# Patient Record
Sex: FEMALE | Race: BLACK OR AFRICAN AMERICAN | Employment: OTHER | URBAN - METROPOLITAN AREA
[De-identification: names, ages, dates, MRNs, and addresses within clinical notes are randomized per-mention and may not be internally consistent; named-entity substitution may affect disease eponyms.]

---

## 2017-09-16 ENCOUNTER — HOSPITAL ENCOUNTER (EMERGENCY)
Age: 72
Discharge: HOME | End: 2017-09-16
Payer: MEDICARE

## 2017-09-16 VITALS
WEIGHT: 147 LBS | OXYGEN SATURATION: 98 % | DIASTOLIC BLOOD PRESSURE: 88 MMHG | HEART RATE: 78 BPM | SYSTOLIC BLOOD PRESSURE: 135 MMHG | TEMPERATURE: 98.1 F | BODY MASS INDEX: 28.71 KG/M2 | RESPIRATION RATE: 16 BRPM

## 2017-09-16 DIAGNOSIS — H11.32 SUBCONJUNCTIVAL HEMATOMA, LEFT: Primary | ICD-10-CM

## 2017-09-16 PROCEDURE — 99283 EMERGENCY DEPT VISIT LOW MDM: CPT

## 2017-09-16 NOTE — ED PROVIDER NOTES
HPI Comments: 79y female with red eye. Patient was not having pain however her daughters noticed that her eye was red and looked like it was bleeding. Patient does not recall any trauma. She has been rubbing her eye yesterday. No sneezes or heart R and coughs no Valsalva or heavy lifting. Patient states her vision is unchanged. She is not on any anticoagulation other than an aspirin a day. She does have a history of A. Fib is on flecainide but no other anticoagulation therapy    Patient is a 67 y.o. female presenting with eye irritation. The history is provided by the patient. Red Eye    This is a new problem. The current episode started 6 to 12 hours ago. The problem occurs constantly. The problem has not changed since onset. The left eye is affected. The injury mechanism was none. The pain is at a severity of 0/10. The patient is experiencing no pain. Associated symptoms include eye redness and negative. Pertinent negatives include no blurred vision, no decreased vision, no discharge, no double vision, no foreign body sensation, no photophobia, no tingling, no pain and no blindness. Past Medical History:   Diagnosis Date    CAD (coronary artery disease)     High cholesterol    Hypertension     Other ill-defined conditions     Paroximal A fib     Other ill-defined conditions     Vitamin D deficiency        Past Surgical History:   Procedure Laterality Date    HX HERNIA REPAIR      HX TUBAL LIGATION           History reviewed. No pertinent family history. Social History     Social History    Marital status:      Spouse name: N/A    Number of children: N/A    Years of education: N/A     Occupational History    Not on file.      Social History Main Topics    Smoking status: Former Smoker    Smokeless tobacco: Never Used    Alcohol use No    Drug use: Not on file    Sexual activity: Not on file     Other Topics Concern    Not on file     Social History Narrative         ALLERGIES: Amlodipine and Sulfa (sulfonamide antibiotics)    Review of Systems   Constitutional: Negative. Negative for activity change. HENT: Negative. Eyes: Positive for redness. Negative for blindness, blurred vision, double vision, photophobia, pain and discharge. Respiratory: Negative. Cardiovascular: Negative. Gastrointestinal: Negative. Genitourinary: Negative. Musculoskeletal: Negative. Skin: Negative. Neurological: Negative. Negative for tingling. Psychiatric/Behavioral: Negative. All other systems reviewed and are negative. Vitals:    09/16/17 1143   BP: 135/88   Pulse: 78   Resp: 16   Temp: 98.1 °F (36.7 °C)   SpO2: 98%   Weight: 66.7 kg (147 lb)            Physical Exam   Constitutional: She is oriented to person, place, and time. She appears well-developed and well-nourished. No distress. HENT:   Head: Normocephalic and atraumatic. Right Ear: External ear normal.   Left Ear: External ear normal.   Nose: Nose normal.   Mouth/Throat: Oropharynx is clear and moist. No oropharyngeal exudate. Eyes: Conjunctivae and EOM are normal. Pupils are equal, round, and reactive to light. Right eye exhibits no discharge. Left eye exhibits no discharge. No scleral icterus. Neck: Normal range of motion. Neck supple. No JVD present. No tracheal deviation present. Cardiovascular: Normal rate, regular rhythm and intact distal pulses. Pulmonary/Chest: Effort normal and breath sounds normal. No stridor. No respiratory distress. She has no wheezes. She exhibits no tenderness. Abdominal: Soft. Bowel sounds are normal. She exhibits no distension and no mass. There is no tenderness. Musculoskeletal: Normal range of motion. She exhibits no edema or tenderness. Neurological: She is alert and oriented to person, place, and time. No cranial nerve deficit. Skin: Skin is warm and dry. No rash noted. She is not diaphoretic. No erythema. No pallor.    Psychiatric: She has a normal mood and affect. Her behavior is normal. Thought content normal.   Nursing note and vitals reviewed. MDM  Number of Diagnoses or Management Options  Diagnosis management comments: Subconjunctival hematoma. Patient has no reason to suspect coagulopathy. Patient's vision is good. We'll have her follow-up in one week with her primary care. She can return to the ED anytime this problem.     ED Course       Procedures

## 2017-09-16 NOTE — ED NOTES
I have reviewed discharge instructions with the patient. The patient verbalized understanding. Patient left ED via Discharge Method: ambulatory to Home with self. Opportunity for questions and clarification provided. Patient given 0 scripts. Patient is in no acute distress at this time.

## 2017-09-16 NOTE — DISCHARGE INSTRUCTIONS
Subconjunctival Hemorrhage: Care Instructions  Your Care Instructions    Sometimes small blood vessels in the white of the eye can break, causing a red spot or speck. This is called a subconjunctival hemorrhage. The blood vessels may break when you sneeze, cough, vomit, strain, or bend over. Sometimes there is no clear cause. The blood may look alarming, especially if the spot is large. If there is no pain or vision change, there is usually no reason to worry, and the blood slowly will go away on its own in 2 to 3 weeks. Follow-up care is a key part of your treatment and safety. Be sure to make and go to all appointments, and call your doctor if you are having problems. It's also a good idea to know your test results and keep a list of the medicines you take. How can you care for yourself at home? · Watch for changes in your eye. It is normal for the red spot on your eyeball to change color as it heals. Just like a bruise on your skin, it may change from red to brown to purple to yellow. · Do not take aspirin or products that contain aspirin, which can increase bleeding. Use acetaminophen (Tylenol) if you need pain relief for another problem. · Do not take two or more pain medicines at the same time unless the doctor told you to. Many pain medicines have acetaminophen, which is Tylenol. Too much acetaminophen (Tylenol) can be harmful. When should you call for help? Call your doctor now or seek immediate medical care if:  · You have signs of an eye infection, such as:  ¨ Pus or thick discharge coming from the eye. ¨ Redness or swelling around the eye. ¨ A fever. · You see blood over the black part of your eye (pupil). · You have any changes or problems in your vision. · You have any pain in your eye. Watch closely for changes in your health, and be sure to contact your doctor if:  · You do not get better as expected. Where can you learn more?   Go to http://tamara-pietro.info/. Enter Y189 in the search box to learn more about \"Subconjunctival Hemorrhage: Care Instructions. \"  Current as of: March 20, 2017  Content Version: 11.3  © 2983-1374 [facility], [employer]. Care instructions adapted under license by [email or web address] (which disclaims liability or warranty for this information). If you have questions about a medical condition or this instruction, always ask your healthcare professional. [address] any warranty or liability for your use of this information.

## 2023-11-04 ENCOUNTER — HOSPITAL ENCOUNTER (EMERGENCY)
Age: 78
Discharge: HOME | End: 2023-11-04
Payer: MEDICARE

## 2023-11-04 ENCOUNTER — APPOINTMENT (OUTPATIENT)
Dept: GENERAL RADIOLOGY | Age: 78
End: 2023-11-04
Payer: MEDICARE

## 2023-11-04 VITALS
OXYGEN SATURATION: 97 % | SYSTOLIC BLOOD PRESSURE: 152 MMHG | TEMPERATURE: 97.4 F | WEIGHT: 125 LBS | RESPIRATION RATE: 16 BRPM | HEART RATE: 80 BPM | HEIGHT: 60 IN | DIASTOLIC BLOOD PRESSURE: 79 MMHG | BODY MASS INDEX: 24.54 KG/M2

## 2023-11-04 DIAGNOSIS — M25.561 ACUTE PAIN OF RIGHT KNEE: Primary | ICD-10-CM

## 2023-11-04 PROCEDURE — 73562 X-RAY EXAM OF KNEE 3: CPT

## 2023-11-04 PROCEDURE — 6370000000 HC RX 637 (ALT 250 FOR IP)

## 2023-11-04 PROCEDURE — 99283 EMERGENCY DEPT VISIT LOW MDM: CPT

## 2023-11-04 RX ORDER — HYDRALAZINE HYDROCHLORIDE 25 MG/1
25 TABLET, FILM COATED ORAL 3 TIMES DAILY
Qty: 90 TABLET | Refills: 11 | COMMUNITY
Start: 2023-06-19 | End: 2024-06-18

## 2023-11-04 RX ORDER — OLMESARTAN MEDOXOMIL 40 MG/1
TABLET ORAL
COMMUNITY
Start: 2023-09-25

## 2023-11-04 RX ORDER — DILTIAZEM HYDROCHLORIDE 240 MG/1
240 CAPSULE, COATED, EXTENDED RELEASE ORAL EVERY MORNING
COMMUNITY
Start: 2023-03-29

## 2023-11-04 RX ORDER — HYDROCODONE BITARTRATE AND ACETAMINOPHEN 5; 325 MG/1; MG/1
1 TABLET ORAL
Status: COMPLETED | OUTPATIENT
Start: 2023-11-04 | End: 2023-11-04

## 2023-11-04 RX ADMIN — HYDROCODONE BITARTRATE AND ACETAMINOPHEN 1 TABLET: 5; 325 TABLET ORAL at 19:01

## 2023-11-04 ASSESSMENT — PAIN DESCRIPTION - ORIENTATION: ORIENTATION: RIGHT

## 2023-11-04 ASSESSMENT — LIFESTYLE VARIABLES
HOW MANY STANDARD DRINKS CONTAINING ALCOHOL DO YOU HAVE ON A TYPICAL DAY: PATIENT DOES NOT DRINK
HOW OFTEN DO YOU HAVE A DRINK CONTAINING ALCOHOL: NEVER

## 2023-11-04 ASSESSMENT — PAIN SCALES - GENERAL: PAINLEVEL_OUTOF10: 10

## 2023-11-04 ASSESSMENT — PAIN DESCRIPTION - LOCATION: LOCATION: KNEE

## 2023-11-04 NOTE — ED PROVIDER NOTES
Emergency Department Provider Note       PCP: Rigoberto Reyes MD   Age: 66 y.o. Sex: female     DISPOSITION Decision To Discharge 2023 07:50:29 PM       ICD-10-CM    1. Acute pain of right knee  M25.561           Medical Decision Making     Complexity of Problems Addressed:  Complexity of Problem: 1 acute, uncomplicated illness or injury. Data Reviewed and Analyzed:  I independently ordered and reviewed each unique test.         I interpreted the X-rays. No acute fracture reviewed with radiologist    Discussion of management or test interpretation. Presented to the emergency department with concern of right knee pain that occurred as a result of accidental fall occurred when she was washing her car outside and fell on some rocks. Tenderness in the medial joint line of right knee. On x-ray imaging no acute osseous abnormality is identified. Given dose of Norco 5 in ED course. Advised conservative symptomatic management at home and discussed return precautions. Range of motion of right knee is intact. Patient is afebrile and there is no erythema or warmth of joint to suggest possible underlying septic joint. Suspicion that etiology of pain is secondary to mechanical fall when she fell onto some rocks earlier this afternoon. Suspicion that the pain is a musculoskeletal etiology. Risk of Complications and/or Morbidity of Patient Management:      History      68-year-old female presents emergency department chief complaint of right knee pain that onset earlier this afternoon. States that she was outside washing her car and then tripped on some rocks. She is poor historian and states that she did not fall onto her right knee but she just fell but cannot provide any further details other than that. Denies head trauma or LOC during the time of that fall.   Reports that following the fall and washing her car she went to a  and then when she went to get up at the end of the  she dislocation. No suprapatellar joint   effusion is present. There are no osseous degenerative changes. There is no   radiopaque foreign body. Impression    Impression:  No acute osseous abnormality is identified. No Powers M.D.   11/4/2023 7:37:00 PM        XR KNEE RIGHT (3 VIEWS)   Final Result   Impression:   No acute osseous abnormality is identified. No Powers M.D.    11/4/2023 7:37:00 PM            Voice dictation software was used during the making of this note. This software is not perfect and grammatical and other typographical errors may be present. This note has not been completely proofread for errors.      [facility], [place]  11/04/23 [id number]

## 2023-11-04 NOTE — DISCHARGE INSTRUCTIONS
There were no concerning findings on your x-ray images today. Apply ice to your knee and take NSAIDs or Tylenol as needed for pain relief. Follow-up with your primary care provider at your next scheduled appointment. Return as needed.

## 2023-11-04 NOTE — ED TRIAGE NOTES
Pt presents with c/o right knee pain that started this evening around 5PM.  Patient tried a Salonpas patch, which was not helpful. Patient reports that she fell around 1 PM today. Denies hitting her head. Does endorse some nausea.